# Patient Record
Sex: FEMALE | Race: BLACK OR AFRICAN AMERICAN | NOT HISPANIC OR LATINO | Employment: FULL TIME | URBAN - METROPOLITAN AREA
[De-identification: names, ages, dates, MRNs, and addresses within clinical notes are randomized per-mention and may not be internally consistent; named-entity substitution may affect disease eponyms.]

---

## 2024-02-22 ENCOUNTER — HOSPITAL ENCOUNTER (EMERGENCY)
Facility: HOSPITAL | Age: 54
Discharge: HOME | End: 2024-02-22
Attending: EMERGENCY MEDICINE

## 2024-02-22 VITALS
RESPIRATION RATE: 16 BRPM | BODY MASS INDEX: 33.32 KG/M2 | WEIGHT: 200 LBS | SYSTOLIC BLOOD PRESSURE: 139 MMHG | HEIGHT: 65 IN | HEART RATE: 86 BPM | OXYGEN SATURATION: 100 % | TEMPERATURE: 98.2 F | DIASTOLIC BLOOD PRESSURE: 94 MMHG

## 2024-02-22 DIAGNOSIS — R73.9 HYPERGLYCEMIA: Primary | ICD-10-CM

## 2024-02-22 LAB
BASOPHILS # BLD AUTO: 0.05 X10*3/UL (ref 0–0.1)
BASOPHILS NFR BLD AUTO: 0.5 %
EOSINOPHIL # BLD AUTO: 0.12 X10*3/UL (ref 0–0.7)
EOSINOPHIL NFR BLD AUTO: 1.3 %
ERYTHROCYTE [DISTWIDTH] IN BLOOD BY AUTOMATED COUNT: 12.9 % (ref 11.5–14.5)
GLUCOSE BLD MANUAL STRIP-MCNC: 218 MG/DL (ref 74–99)
GLUCOSE BLD MANUAL STRIP-MCNC: 252 MG/DL (ref 74–99)
HCT VFR BLD AUTO: 41.7 % (ref 36–46)
HGB BLD-MCNC: 14.4 G/DL (ref 12–16)
IMM GRANULOCYTES # BLD AUTO: 0.02 X10*3/UL (ref 0–0.7)
IMM GRANULOCYTES NFR BLD AUTO: 0.2 % (ref 0–0.9)
LYMPHOCYTES # BLD AUTO: 2.34 X10*3/UL (ref 1.2–4.8)
LYMPHOCYTES NFR BLD AUTO: 25.6 %
MCH RBC QN AUTO: 26.1 PG (ref 26–34)
MCHC RBC AUTO-ENTMCNC: 34.5 G/DL (ref 32–36)
MCV RBC AUTO: 76 FL (ref 80–100)
MONOCYTES # BLD AUTO: 0.56 X10*3/UL (ref 0.1–1)
MONOCYTES NFR BLD AUTO: 6.1 %
NEUTROPHILS # BLD AUTO: 6.06 X10*3/UL (ref 1.2–7.7)
NEUTROPHILS NFR BLD AUTO: 66.3 %
NRBC BLD-RTO: 0 /100 WBCS (ref 0–0)
PLATELET # BLD AUTO: 280 X10*3/UL (ref 150–450)
RBC # BLD AUTO: 5.51 X10*6/UL (ref 4–5.2)
WBC # BLD AUTO: 9.2 X10*3/UL (ref 4.4–11.3)

## 2024-02-22 PROCEDURE — 2500000004 HC RX 250 GENERAL PHARMACY W/ HCPCS (ALT 636 FOR OP/ED)

## 2024-02-22 PROCEDURE — 85025 COMPLETE CBC W/AUTO DIFF WBC: CPT

## 2024-02-22 PROCEDURE — 99284 EMERGENCY DEPT VISIT MOD MDM: CPT | Performed by: EMERGENCY MEDICINE

## 2024-02-22 PROCEDURE — 82947 ASSAY GLUCOSE BLOOD QUANT: CPT

## 2024-02-22 PROCEDURE — 36415 COLL VENOUS BLD VENIPUNCTURE: CPT

## 2024-02-22 PROCEDURE — 96360 HYDRATION IV INFUSION INIT: CPT

## 2024-02-22 PROCEDURE — 83036 HEMOGLOBIN GLYCOSYLATED A1C: CPT

## 2024-02-22 PROCEDURE — 99283 EMERGENCY DEPT VISIT LOW MDM: CPT | Mod: 25

## 2024-02-22 RX ADMIN — SODIUM CHLORIDE, POTASSIUM CHLORIDE, SODIUM LACTATE AND CALCIUM CHLORIDE 1000 ML: 600; 310; 30; 20 INJECTION, SOLUTION INTRAVENOUS at 18:42

## 2024-02-22 ASSESSMENT — PAIN - FUNCTIONAL ASSESSMENT: PAIN_FUNCTIONAL_ASSESSMENT: 0-10

## 2024-02-22 ASSESSMENT — PAIN SCALES - GENERAL
PAINLEVEL_OUTOF10: 0 - NO PAIN
PAINLEVEL_OUTOF10: 0 - NO PAIN

## 2024-02-22 ASSESSMENT — COLUMBIA-SUICIDE SEVERITY RATING SCALE - C-SSRS
1. IN THE PAST MONTH, HAVE YOU WISHED YOU WERE DEAD OR WISHED YOU COULD GO TO SLEEP AND NOT WAKE UP?: NO
6. HAVE YOU EVER DONE ANYTHING, STARTED TO DO ANYTHING, OR PREPARED TO DO ANYTHING TO END YOUR LIFE?: NO
2. HAVE YOU ACTUALLY HAD ANY THOUGHTS OF KILLING YOURSELF?: NO

## 2024-02-23 LAB
EST. AVERAGE GLUCOSE BLD GHB EST-MCNC: 315 MG/DL
HBA1C MFR BLD: 12.6 %

## 2024-02-23 NOTE — DISCHARGE INSTRUCTIONS
You were evaluated today with concern for high blood sugar.  Your blood work today was very reassuring with no signs of derangements in your electrolytes.  Your glucose was 252.  I collected an A1c that you can follow-up on to present to your primary care doctor.  Please talk to her about your diabetic regimen as metformin does not make you feel good and you need to be on medication.  I have attached some information to your discharge paperwork for your reference if you feel you need it.

## 2024-02-23 NOTE — ED PROVIDER NOTES
HPI   Chief Complaint   Patient presents with    Hyperglycemia     Pt BIB EMS with c/o hyperglycemia. BS for . Patient also c/o R sided head tingle no pain. Patient stated that she doesn't take her metformin due to nausea       Patient is a 53-year-old female presenting to the emergency department via EMS with concern for high blood sugar.  Patient has a history of type 2 diabetes was diagnosed last year and is currently taking metformin.  Patient reports that she has not been taking it consistently as metformin causes a lot of GI upset, nausea, vomiting and diarrhea.  Is also endorsing a metallic taste in her mouth when she takes it.  Denies affiliated weakness, confusion, lethargy or fatigue.  Is endorsing some right scalp tingling but no vision changes, headache, loss of vision, facial droop, changes in speech, changes in strength or sensation in the upper and lower extremities.  Also denies chest pain, shortness of breath, abdominal pain, nausea, vomiting and diarrhea currently.  States that she has a primary care provider in [place] and is out of town visiting.  Reports that she has a follow-up appointment to discuss her medications.  Has never been on insulin.  Has no other acute complaints.                          [place] Coma Scale Score: 15                     Patient History   No past medical history on file.  No past surgical history on file.  No family history on file.  Social History     Tobacco Use    Smoking status: Not on file    Smokeless tobacco: Not on file   Substance Use Topics    Alcohol use: Not on file    Drug use: Not on file       Physical Exam   ED Triage Vitals   Temperature Heart Rate Respirations BP   02/22/24 1936 02/22/24 1756 02/22/24 1756 02/22/24 1756   36.8 °C (98.2 °F) 98 18 (!) 149/98      Pulse Ox Temp Source Heart Rate Source Patient Position   02/22/24 1756 02/22/24 1936 -- 02/22/24 1936   98 % Oral  Lying      BP Location FiO2 (%)     02/22/24 1936 --     Right arm         Physical Exam  Vitals and nursing note reviewed.   Constitutional:       General: She is not in acute distress.     Appearance: Normal appearance. She is not toxic-appearing.   HENT:      Head: Normocephalic.      Mouth/Throat:      Mouth: Mucous membranes are moist.   Eyes:      Conjunctiva/sclera: Conjunctivae normal.      Pupils: Pupils are equal, round, and reactive to light.   Cardiovascular:      Rate and Rhythm: Normal rate and regular rhythm.      Pulses:           Radial pulses are 2+ on the right side and 2+ on the left side.   Pulmonary:      Effort: Pulmonary effort is normal. No respiratory distress.      Breath sounds: Normal breath sounds.   Abdominal:      General: Abdomen is flat.      Palpations: Abdomen is soft.      Tenderness: There is no abdominal tenderness. There is no guarding or rebound.   Musculoskeletal:      Cervical back: Normal range of motion and neck supple.      Right lower leg: No edema.      Left lower leg: No edema.   Skin:     General: Skin is warm.   Neurological:      Mental Status: She is alert and oriented to person, place, and time.   Psychiatric:         Mood and Affect: Mood normal.         ED Course & MDM   Diagnoses as of 02/22/24 2236   Hyperglycemia       Medical Decision Making  53-year-old female with type 2 diabetes presenting with hyperglycemia. Considered DKA versus HHS, sepsis as possible etiologies of the patient's current presentation. However, given the current history & physical, including current lab values, the current presentation is consistent with acute, asymptomatic hyperglycemia with no signs of DKA or HHS. Patient non toxic appearing with no signs of infection or ischemia.  Patient states that she is not taking her metformin given it causes her GI symptoms.  Patient has a primary care provider with whom she is already scheduled an appointment to discuss addressing her medications.  She is not insulin-dependent and her glucose today was 252 on  fingerstick.  Mentating appropriately otherwise very well-appearing, do not feel intervention is indicated at this time however given she is visiting in town, given IV fluids and basic labs collected including an A1c which she can use to follow-up with her primary care as she has concerns that it might of increased.  Do not feel that further workup indicated at this time. Discussed results, diagnosis/differential, and plan with patient. Patient advised to follow up with primary physician in 2-3 days. Discussed return precautions and encouraged patient to return to the Emergency Department for any concerning symptoms or worsening condition. Patient expresses understanding and is in agreement. All questions answered. Patient discharged in stable condition.        Procedure  Procedures     Apoorva Orr DO  Resident  02/22/24 5548